# Patient Record
Sex: MALE | Race: WHITE | Employment: OTHER | URBAN - NONMETROPOLITAN AREA
[De-identification: names, ages, dates, MRNs, and addresses within clinical notes are randomized per-mention and may not be internally consistent; named-entity substitution may affect disease eponyms.]

---

## 2018-01-28 ENCOUNTER — HOSPITAL ENCOUNTER (INPATIENT)
Age: 68
LOS: 2 days | Discharge: HOME | DRG: 175 | End: 2018-01-30
Attending: HOSPITALIST | Admitting: HOSPITALIST
Payer: MEDICARE

## 2018-01-28 ENCOUNTER — APPOINTMENT (OUTPATIENT)
Dept: INTERVENTIONAL RADIOLOGY/VASCULAR | Age: 68
DRG: 175 | End: 2018-01-28
Attending: HOSPITALIST
Payer: MEDICARE

## 2018-01-28 PROBLEM — R77.8 ELEVATED TROPONIN: Status: ACTIVE | Noted: 2018-01-28

## 2018-01-28 PROBLEM — I10 ESSENTIAL HYPERTENSION: Status: ACTIVE | Noted: 2018-01-28

## 2018-01-28 PROBLEM — I26.92 ACUTE SADDLE PULMONARY EMBOLISM WITHOUT ACUTE COR PULMONALE (HCC): Status: ACTIVE | Noted: 2018-01-28

## 2018-01-28 PROBLEM — E11.69 TYPE 2 DIABETES MELLITUS WITH OTHER SPECIFIED COMPLICATION (HCC): Status: ACTIVE | Noted: 2018-01-28

## 2018-01-28 PROBLEM — E66.9 OBESITY (BMI 30.0-34.9): Status: ACTIVE | Noted: 2018-01-28

## 2018-01-28 PROBLEM — R91.1 PULMONARY NODULE: Status: ACTIVE | Noted: 2018-01-28

## 2018-01-28 LAB
ABO/RH: NORMAL
ANION GAP SERPL CALCULATED.3IONS-SCNC: 16 MMOL/L (ref 7–19)
ANTIBODY SCREEN: NORMAL
APTT: 32 SEC (ref 26–36.2)
APTT: 32 SEC (ref 26–36.2)
APTT: 33.6 SEC (ref 26–36.2)
BUN BLDV-MCNC: 34 MG/DL (ref 8–23)
CALCIUM SERPL-MCNC: 8.3 MG/DL (ref 8.8–10.2)
CHLORIDE BLD-SCNC: 94 MMOL/L (ref 98–111)
CHOLESTEROL, TOTAL: 115 MG/DL (ref 160–199)
CO2: 25 MMOL/L (ref 22–29)
CREAT SERPL-MCNC: 1.4 MG/DL (ref 0.5–1.2)
FIBRINOGEN: 319 MG/DL (ref 238–505)
FIBRINOGEN: 341 MG/DL (ref 238–505)
FIBRINOGEN: 345 MG/DL (ref 238–505)
GFR NON-AFRICAN AMERICAN: 51
GLUCOSE BLD-MCNC: 328 MG/DL (ref 70–99)
GLUCOSE BLD-MCNC: 336 MG/DL (ref 70–99)
GLUCOSE BLD-MCNC: 353 MG/DL (ref 70–99)
GLUCOSE BLD-MCNC: 429 MG/DL (ref 70–99)
GLUCOSE BLD-MCNC: 457 MG/DL (ref 74–109)
GLUCOSE BLD-MCNC: 463 MG/DL (ref 70–99)
HBA1C MFR BLD: 11.3 %
HDLC SERPL-MCNC: 28 MG/DL (ref 55–121)
INR BLD: 1.22 (ref 0.88–1.18)
LDL CHOLESTEROL CALCULATED: 42 MG/DL
LV EF: 60 %
LVEF MODALITY: NORMAL
PERFORMED ON: ABNORMAL
POTASSIUM SERPL-SCNC: 3.6 MMOL/L (ref 3.5–5)
PROTHROMBIN TIME: 15.4 SEC (ref 12–14.6)
SODIUM BLD-SCNC: 135 MMOL/L (ref 136–145)
TRIGL SERPL-MCNC: 226 MG/DL (ref 0–149)
TROPONIN: 0.07 NG/ML (ref 0–0.03)
TROPONIN: 0.15 NG/ML (ref 0–0.03)

## 2018-01-28 PROCEDURE — 6360000002 HC RX W HCPCS: Performed by: SURGERY

## 2018-01-28 PROCEDURE — 2580000003 HC RX 258: Performed by: SURGERY

## 2018-01-28 PROCEDURE — 85610 PROTHROMBIN TIME: CPT

## 2018-01-28 PROCEDURE — 93005 ELECTROCARDIOGRAM TRACING: CPT

## 2018-01-28 PROCEDURE — 75741 ARTERY X-RAYS LUNG: CPT | Performed by: SURGERY

## 2018-01-28 PROCEDURE — 37211 THROMBOLYTIC ART THERAPY: CPT | Performed by: SURGERY

## 2018-01-28 PROCEDURE — C1769 GUIDE WIRE: HCPCS

## 2018-01-28 PROCEDURE — C9113 INJ PANTOPRAZOLE SODIUM, VIA: HCPCS | Performed by: HOSPITALIST

## 2018-01-28 PROCEDURE — 2500000003 HC RX 250 WO HCPCS: Performed by: SURGERY

## 2018-01-28 PROCEDURE — 6370000000 HC RX 637 (ALT 250 FOR IP): Performed by: HOSPITALIST

## 2018-01-28 PROCEDURE — 36015 PLACE CATHETER IN ARTERY: CPT | Performed by: SURGERY

## 2018-01-28 PROCEDURE — 36415 COLL VENOUS BLD VENIPUNCTURE: CPT

## 2018-01-28 PROCEDURE — 2580000003 HC RX 258: Performed by: INTERNAL MEDICINE

## 2018-01-28 PROCEDURE — 85730 THROMBOPLASTIN TIME PARTIAL: CPT

## 2018-01-28 PROCEDURE — 80061 LIPID PANEL: CPT

## 2018-01-28 PROCEDURE — 99291 CRITICAL CARE FIRST HOUR: CPT | Performed by: INTERNAL MEDICINE

## 2018-01-28 PROCEDURE — 84484 ASSAY OF TROPONIN QUANT: CPT

## 2018-01-28 PROCEDURE — 75774 ARTERY X-RAY EACH VESSEL: CPT | Performed by: SURGERY

## 2018-01-28 PROCEDURE — 85220 BLOOC CLOT FACTOR V TEST: CPT

## 2018-01-28 PROCEDURE — 86850 RBC ANTIBODY SCREEN: CPT

## 2018-01-28 PROCEDURE — 93306 TTE W/DOPPLER COMPLETE: CPT

## 2018-01-28 PROCEDURE — 83036 HEMOGLOBIN GLYCOSYLATED A1C: CPT

## 2018-01-28 PROCEDURE — 82948 REAGENT STRIP/BLOOD GLUCOSE: CPT

## 2018-01-28 PROCEDURE — 6360000002 HC RX W HCPCS: Performed by: HOSPITALIST

## 2018-01-28 PROCEDURE — 94660 CPAP INITIATION&MGMT: CPT

## 2018-01-28 PROCEDURE — 6370000000 HC RX 637 (ALT 250 FOR IP): Performed by: SURGERY

## 2018-01-28 PROCEDURE — 93970 EXTREMITY STUDY: CPT

## 2018-01-28 PROCEDURE — 85384 FIBRINOGEN ACTIVITY: CPT

## 2018-01-28 PROCEDURE — 6360000004 HC RX CONTRAST MEDICATION: Performed by: SURGERY

## 2018-01-28 PROCEDURE — 2700000000 HC OXYGEN THERAPY PER DAY

## 2018-01-28 PROCEDURE — 76937 US GUIDE VASCULAR ACCESS: CPT | Performed by: SURGERY

## 2018-01-28 PROCEDURE — 80048 BASIC METABOLIC PNL TOTAL CA: CPT

## 2018-01-28 PROCEDURE — 99222 1ST HOSP IP/OBS MODERATE 55: CPT | Performed by: SURGERY

## 2018-01-28 PROCEDURE — 2000000000 HC ICU R&B

## 2018-01-28 PROCEDURE — 6370000000 HC RX 637 (ALT 250 FOR IP): Performed by: INTERNAL MEDICINE

## 2018-01-28 PROCEDURE — 86901 BLOOD TYPING SEROLOGIC RH(D): CPT

## 2018-01-28 PROCEDURE — 86900 BLOOD TYPING SEROLOGIC ABO: CPT

## 2018-01-28 PROCEDURE — 2709999900 IR ANGIOGRAM PULMONARY BILATERAL

## 2018-01-28 RX ORDER — HEPARIN SODIUM AND DEXTROSE 10000; 5 [USP'U]/100ML; G/100ML
400 INJECTION INTRAVENOUS CONTINUOUS
Status: DISCONTINUED | OUTPATIENT
Start: 2018-01-28 | End: 2018-01-29

## 2018-01-28 RX ORDER — MIDAZOLAM HYDROCHLORIDE 1 MG/ML
INJECTION INTRAMUSCULAR; INTRAVENOUS
Status: COMPLETED | OUTPATIENT
Start: 2018-01-28 | End: 2018-01-28

## 2018-01-28 RX ORDER — SODIUM CHLORIDE 9 MG/ML
INJECTION, SOLUTION INTRAVENOUS CONTINUOUS
Status: DISCONTINUED | OUTPATIENT
Start: 2018-01-28 | End: 2018-01-30 | Stop reason: HOSPADM

## 2018-01-28 RX ORDER — FENTANYL CITRATE 50 UG/ML
INJECTION, SOLUTION INTRAMUSCULAR; INTRAVENOUS
Status: COMPLETED | OUTPATIENT
Start: 2018-01-28 | End: 2018-01-28

## 2018-01-28 RX ORDER — PANTOPRAZOLE SODIUM 40 MG/10ML
40 INJECTION, POWDER, LYOPHILIZED, FOR SOLUTION INTRAVENOUS 2 TIMES DAILY
Status: DISCONTINUED | OUTPATIENT
Start: 2018-01-28 | End: 2018-01-30 | Stop reason: HOSPADM

## 2018-01-28 RX ORDER — SODIUM CHLORIDE 0.9 % (FLUSH) 0.9 %
10 SYRINGE (ML) INJECTION PRN
Status: DISCONTINUED | OUTPATIENT
Start: 2018-01-28 | End: 2018-01-30 | Stop reason: HOSPADM

## 2018-01-28 RX ORDER — SODIUM CHLORIDE 0.9 % (FLUSH) 0.9 %
10 SYRINGE (ML) INJECTION EVERY 12 HOURS SCHEDULED
Status: DISCONTINUED | OUTPATIENT
Start: 2018-01-28 | End: 2018-01-30 | Stop reason: HOSPADM

## 2018-01-28 RX ORDER — LISINOPRIL 5 MG/1
5 TABLET ORAL DAILY
COMMUNITY

## 2018-01-28 RX ORDER — LISINOPRIL 5 MG/1
5 TABLET ORAL DAILY
Status: DISCONTINUED | OUTPATIENT
Start: 2018-01-28 | End: 2018-01-30 | Stop reason: HOSPADM

## 2018-01-28 RX ORDER — ACETAMINOPHEN 325 MG/1
650 TABLET ORAL EVERY 4 HOURS PRN
Status: DISCONTINUED | OUTPATIENT
Start: 2018-01-28 | End: 2018-01-30 | Stop reason: HOSPADM

## 2018-01-28 RX ORDER — LIDOCAINE HYDROCHLORIDE 20 MG/ML
INJECTION, SOLUTION INFILTRATION; PERINEURAL
Status: COMPLETED | OUTPATIENT
Start: 2018-01-28 | End: 2018-01-28

## 2018-01-28 RX ORDER — NICOTINE POLACRILEX 4 MG
15 LOZENGE BUCCAL PRN
Status: DISCONTINUED | OUTPATIENT
Start: 2018-01-28 | End: 2018-01-30 | Stop reason: HOSPADM

## 2018-01-28 RX ORDER — IODIXANOL 320 MG/ML
INJECTION, SOLUTION INTRAVASCULAR
Status: COMPLETED | OUTPATIENT
Start: 2018-01-28 | End: 2018-01-28

## 2018-01-28 RX ORDER — ASPIRIN 81 MG/1
81 TABLET, CHEWABLE ORAL DAILY
Status: DISCONTINUED | OUTPATIENT
Start: 2018-01-29 | End: 2018-01-30 | Stop reason: HOSPADM

## 2018-01-28 RX ORDER — DEXTROSE MONOHYDRATE 25 G/50ML
12.5 INJECTION, SOLUTION INTRAVENOUS PRN
Status: DISCONTINUED | OUTPATIENT
Start: 2018-01-28 | End: 2018-01-30 | Stop reason: HOSPADM

## 2018-01-28 RX ORDER — DEXTROSE MONOHYDRATE 50 MG/ML
100 INJECTION, SOLUTION INTRAVENOUS PRN
Status: DISCONTINUED | OUTPATIENT
Start: 2018-01-28 | End: 2018-01-30 | Stop reason: HOSPADM

## 2018-01-28 RX ORDER — ONDANSETRON 2 MG/ML
4 INJECTION INTRAMUSCULAR; INTRAVENOUS EVERY 6 HOURS PRN
Status: DISCONTINUED | OUTPATIENT
Start: 2018-01-28 | End: 2018-01-30 | Stop reason: HOSPADM

## 2018-01-28 RX ORDER — HYDROCODONE BITARTRATE AND ACETAMINOPHEN 7.5; 325 MG/1; MG/1
1 TABLET ORAL ONCE
Status: COMPLETED | OUTPATIENT
Start: 2018-01-28 | End: 2018-01-28

## 2018-01-28 RX ORDER — HEPARIN SODIUM 5000 [USP'U]/ML
INJECTION, SOLUTION INTRAVENOUS; SUBCUTANEOUS
Status: COMPLETED | OUTPATIENT
Start: 2018-01-28 | End: 2018-01-28

## 2018-01-28 RX ADMIN — HEPARIN SODIUM 5000 UNITS: 5000 INJECTION, SOLUTION INTRAVENOUS; SUBCUTANEOUS at 11:54

## 2018-01-28 RX ADMIN — HYDROCODONE BITARTRATE AND ACETAMINOPHEN 1 TABLET: 7.5; 325 TABLET ORAL at 22:03

## 2018-01-28 RX ADMIN — FENTANYL CITRATE 25 MCG: 50 INJECTION, SOLUTION INTRAMUSCULAR; INTRAVENOUS at 11:44

## 2018-01-28 RX ADMIN — CEFAZOLIN SODIUM 1 G: 1 INJECTION, SOLUTION INTRAVENOUS at 11:41

## 2018-01-28 RX ADMIN — LISINOPRIL 5 MG: 5 TABLET ORAL at 15:42

## 2018-01-28 RX ADMIN — LIDOCAINE HYDROCHLORIDE 10 ML: 20 INJECTION, SOLUTION INFILTRATION; PERINEURAL at 11:54

## 2018-01-28 RX ADMIN — PANTOPRAZOLE SODIUM 40 MG: 40 INJECTION, POWDER, FOR SOLUTION INTRAVENOUS at 10:59

## 2018-01-28 RX ADMIN — CEFAZOLIN SODIUM 1 G: 1 INJECTION, SOLUTION INTRAVENOUS at 20:28

## 2018-01-28 RX ADMIN — FENTANYL CITRATE 25 MCG: 50 INJECTION, SOLUTION INTRAMUSCULAR; INTRAVENOUS at 12:03

## 2018-01-28 RX ADMIN — IODIXANOL 30 ML: 320 INJECTION, SOLUTION INTRAVASCULAR at 12:25

## 2018-01-28 RX ADMIN — INSULIN LISPRO 11 UNITS: 100 INJECTION, SOLUTION INTRAVENOUS; SUBCUTANEOUS at 20:34

## 2018-01-28 RX ADMIN — INSULIN LISPRO 12 UNITS: 100 INJECTION, SOLUTION INTRAVENOUS; SUBCUTANEOUS at 17:36

## 2018-01-28 RX ADMIN — SODIUM CHLORIDE: 9 INJECTION, SOLUTION INTRAVENOUS at 09:16

## 2018-01-28 RX ADMIN — Medication 10 ML: at 10:21

## 2018-01-28 RX ADMIN — INSULIN LISPRO 8 UNITS: 100 INJECTION, SOLUTION INTRAVENOUS; SUBCUTANEOUS at 10:18

## 2018-01-28 RX ADMIN — MIDAZOLAM HYDROCHLORIDE 1 MG: 1 INJECTION, SOLUTION INTRAMUSCULAR; INTRAVENOUS at 12:03

## 2018-01-28 RX ADMIN — Medication 10 ML: at 20:28

## 2018-01-28 RX ADMIN — ALTEPLASE 1 MG/HR: 2.2 INJECTION, POWDER, LYOPHILIZED, FOR SOLUTION INTRAVENOUS at 12:20

## 2018-01-28 RX ADMIN — FENTANYL CITRATE 50 MCG: 50 INJECTION, SOLUTION INTRAMUSCULAR; INTRAVENOUS at 12:12

## 2018-01-28 RX ADMIN — MIDAZOLAM HYDROCHLORIDE 1 MG: 1 INJECTION, SOLUTION INTRAMUSCULAR; INTRAVENOUS at 11:44

## 2018-01-28 RX ADMIN — HEPARIN SODIUM AND DEXTROSE 400 UNITS/HR: 10000; 5 INJECTION INTRAVENOUS at 12:19

## 2018-01-28 RX ADMIN — ALTEPLASE 1 MG/HR: 2.2 INJECTION, POWDER, LYOPHILIZED, FOR SOLUTION INTRAVENOUS at 21:59

## 2018-01-28 RX ADMIN — PANTOPRAZOLE SODIUM 40 MG: 40 INJECTION, POWDER, FOR SOLUTION INTRAVENOUS at 20:28

## 2018-01-28 RX ADMIN — SODIUM CHLORIDE: 9 INJECTION, SOLUTION INTRAVENOUS at 12:20

## 2018-01-28 RX ADMIN — ALTEPLASE 6 MG: 2.2 INJECTION, POWDER, LYOPHILIZED, FOR SOLUTION INTRAVENOUS at 12:09

## 2018-01-28 ASSESSMENT — PAIN SCALES - GENERAL
PAINLEVEL_OUTOF10: 0
PAINLEVEL_OUTOF10: 0
PAINLEVEL_OUTOF10: 6
PAINLEVEL_OUTOF10: 0
PAINLEVEL_OUTOF10: 0

## 2018-01-28 ASSESSMENT — ENCOUNTER SYMPTOMS
DOUBLE VISION: 0
SHORTNESS OF BREATH: 0
COUGH: 1
ORTHOPNEA: 0
PHOTOPHOBIA: 0
NAUSEA: 0
VOMITING: 0
HEARTBURN: 0
BLURRED VISION: 0
SPUTUM PRODUCTION: 0
HEMOPTYSIS: 0
WHEEZING: 0

## 2018-01-28 NOTE — H&P
Hospital Medicine    History & Physical      CC: Syncopal episode transferred from another hospital with saddle pulmonary embolism    History Obtained From:  patient, electronic medical record    HISTORY OF PRESENT ILLNESS:    The patient is a 74 y.o. male who presents to an outside hospital after syncopal episode he have any premonitory symptoms however he did have some difficulty breathing while he was on the floor, after he was in the hospital he was not short of breath. The case was discussed with vascular surgeon and instructed to be transferred to this facility. He denies any chest pain, he does have a strong family history of deep venous thrombosis with factor V deficiency, however he was tested in [place] and he was told he didn't have any problems. He does recall he had a few weeks' history of coughing, denies any lower extremity swelling no recent travel,  He did receive Lovenox full dose and another hospital and we will consult with vascular to see if they prefer Lovenox versus heparin drip. Past Medical History:    No past medical history on file. Hypertension  Past Surgical History:    No past surgical history on file. Medications Prior to Admission:    No prescriptions prior to admission. Allergies:  Review of patient's allergies indicates not on file. Social History:   TOBACCO:   has no tobacco history on file. ETOH:   has no alcohol history on file. Patient currently lives with family wife, denies alcohol use he quit smoking many years ago     Family History:   No family history on file. I have personally reviewed above histories  REVIEW OF SYSTEMS:  Review of Systems   Constitutional: Negative for chills, fever, malaise/fatigue and weight loss. HENT: Negative. Eyes: Negative for blurred vision, double vision and photophobia. Respiratory: Positive for cough. Negative for hemoptysis, sputum production, shortness of breath and wheezing.     Cardiovascular: Negative for chest pain, palpitations, orthopnea, claudication and leg swelling. Gastrointestinal: Negative for heartburn, nausea and vomiting. Genitourinary: Negative. Musculoskeletal: Negative. Skin: Negative. Neurological: Negative. Endo/Heme/Allergies: Negative. Psychiatric/Behavioral: Negative. PHYSICAL EXAM:  /71   Pulse 91   Resp 17   SpO2 95%   Physical Exam   Constitutional: He is oriented to person, place, and time. Vital signs are normal. He appears well-developed and well-nourished. Non-toxic appearance. He does not have a sickly appearance. He does not appear ill. No distress. HENT:   Head: Normocephalic and atraumatic. Eyes: Lids are normal. Pupils are equal, round, and reactive to light. Neck: Neck supple. No JVD present. Carotid bruit is not present. Cardiovascular: Normal rate and regular rhythm. Exam reveals no S3. No murmur heard. Pulses:       Carotid pulses are 2+ on the right side, and 2+ on the left side. Radial pulses are 2+ on the right side, and 2+ on the left side. Pulmonary/Chest: Effort normal and breath sounds normal. He has no decreased breath sounds. He has no wheezes. He has no rhonchi. He has no rales. Abdominal: Soft. Normal appearance and bowel sounds are normal. There is no hepatosplenomegaly. There is no tenderness. Musculoskeletal:        Right lower leg: He exhibits no swelling and no edema. Left lower leg: He exhibits no swelling and no edema. Neurological: He is alert and oriented to person, place, and time. No sensory deficit. Skin: Skin is warm and dry. Psychiatric: His speech is normal and behavior is normal.     DATA:  EKG:  I have reviewed EKG with the following interpretation:  Imaging:    No orders to display            Labs Reviewed - No data to display       IMPRESSION:    1. Saddle pulmonary embolism  2. Syncope secondary to above  3. History of hypertension      PLAN:   1. Admit the patient to CCU  2.  Oxygen per nasal cannula  3. Repeat EKG  4. Echocardiogram  5. Repeat labs  6. Vascular surgery consult  7. This color now with Lovenox will as vascular what their preference is if they decide to do an intervention  8. Review medication  9. I did explain to the patient and his wife his condition and plan of care. Critical care time 35 minutes the patient has a potential for deterioration in view of his pulmonary embolism.       Sugar Maynard MD    Internal Medicine Hospitalist

## 2018-01-28 NOTE — CONSULTS
Physiologic. Skin  warm, dry, and intact. No rash, erythema, or pallor. Psychiatric  mood, affect, and behavior appear normal.  Judgment and thought processes appear normal.    I went over his Echo with Dr. Elsi Rodriguez and also reviewed the images and report of his CT from [address] [place] [address] have been discussed with the patient including continued medical management and proceeding with lysis,  By echo he has right heart strain (as well as by CT)    I went over with him the regardless of acute treatment he will need to be on anticoagulation. He asked appropriate questions and wishes to proceed with lysis. Risks of angiography and lysis and potential intervention have been reviewed with the patient including but not limited to MI, death, CVA, bleeding, nerve injury, infection, atheroembolic event, contrast nephropathy, possible dialysis, and need for possible surgery. Assessment  1. PE with right heart stain      Plan    1. Lysis, via EKOS catheter.

## 2018-01-29 LAB
ALBUMIN SERPL-MCNC: 3.2 G/DL (ref 3.5–5.2)
ALP BLD-CCNC: 53 U/L (ref 40–130)
ALT SERPL-CCNC: 19 U/L (ref 5–41)
ANION GAP SERPL CALCULATED.3IONS-SCNC: 11 MMOL/L (ref 7–19)
APTT: 33.2 SEC (ref 26–36.2)
AST SERPL-CCNC: 15 U/L (ref 5–40)
BASOPHILS ABSOLUTE: 0 K/UL (ref 0–0.2)
BASOPHILS RELATIVE PERCENT: 0.2 % (ref 0–1)
BILIRUB SERPL-MCNC: 0.6 MG/DL (ref 0.2–1.2)
BUN BLDV-MCNC: 23 MG/DL (ref 8–23)
CALCIUM SERPL-MCNC: 8 MG/DL (ref 8.8–10.2)
CHLORIDE BLD-SCNC: 99 MMOL/L (ref 98–111)
CO2: 27 MMOL/L (ref 22–29)
CREAT SERPL-MCNC: 1.2 MG/DL (ref 0.5–1.2)
EKG P AXIS: 46 DEGREES
EKG P-R INTERVAL: 148 MS
EKG Q-T INTERVAL: 358 MS
EKG QRS DURATION: 96 MS
EKG QTC CALCULATION (BAZETT): 408 MS
EKG T AXIS: 36 DEGREES
EOSINOPHILS ABSOLUTE: 0.1 K/UL (ref 0–0.6)
EOSINOPHILS RELATIVE PERCENT: 0.7 % (ref 0–5)
GFR NON-AFRICAN AMERICAN: >60
GLUCOSE BLD-MCNC: 250 MG/DL (ref 70–99)
GLUCOSE BLD-MCNC: 267 MG/DL (ref 70–99)
GLUCOSE BLD-MCNC: 283 MG/DL (ref 74–109)
GLUCOSE BLD-MCNC: 308 MG/DL (ref 70–99)
GLUCOSE BLD-MCNC: 336 MG/DL (ref 70–99)
HCT VFR BLD CALC: 37 % (ref 42–52)
HEMOGLOBIN: 12.5 G/DL (ref 14–18)
INR BLD: 1.14 (ref 0.88–1.18)
LYMPHOCYTES ABSOLUTE: 1.5 K/UL (ref 1.1–4.5)
LYMPHOCYTES RELATIVE PERCENT: 10.2 % (ref 20–40)
MCH RBC QN AUTO: 31.5 PG (ref 27–31)
MCHC RBC AUTO-ENTMCNC: 33.8 G/DL (ref 33–37)
MCV RBC AUTO: 93.2 FL (ref 80–94)
MONOCYTES ABSOLUTE: 1 K/UL (ref 0–0.9)
MONOCYTES RELATIVE PERCENT: 6.9 % (ref 0–10)
NEUTROPHILS ABSOLUTE: 11.7 K/UL (ref 1.5–7.5)
NEUTROPHILS RELATIVE PERCENT: 81.5 % (ref 50–65)
PDW BLD-RTO: 12.3 % (ref 11.5–14.5)
PERFORMED ON: ABNORMAL
PLATELET # BLD: 131 K/UL (ref 130–400)
PMV BLD AUTO: 10.6 FL (ref 9.4–12.4)
POTASSIUM SERPL-SCNC: 3.5 MMOL/L (ref 3.5–5)
PROTHROMBIN TIME: 14.5 SEC (ref 12–14.6)
RBC # BLD: 3.97 M/UL (ref 4.7–6.1)
SODIUM BLD-SCNC: 137 MMOL/L (ref 136–145)
TOTAL PROTEIN: 6.5 G/DL (ref 6.6–8.7)
WBC # BLD: 14.3 K/UL (ref 4.8–10.8)

## 2018-01-29 PROCEDURE — 36415 COLL VENOUS BLD VENIPUNCTURE: CPT

## 2018-01-29 PROCEDURE — 6370000000 HC RX 637 (ALT 250 FOR IP): Performed by: SURGERY

## 2018-01-29 PROCEDURE — 2700000000 HC OXYGEN THERAPY PER DAY

## 2018-01-29 PROCEDURE — 6360000002 HC RX W HCPCS: Performed by: HOSPITALIST

## 2018-01-29 PROCEDURE — 99291 CRITICAL CARE FIRST HOUR: CPT | Performed by: FAMILY MEDICINE

## 2018-01-29 PROCEDURE — B31TYZZ FLUOROSCOPY OF LEFT PULMONARY ARTERY USING OTHER CONTRAST: ICD-10-PCS | Performed by: SURGERY

## 2018-01-29 PROCEDURE — 6370000000 HC RX 637 (ALT 250 FOR IP): Performed by: HOSPITALIST

## 2018-01-29 PROCEDURE — 80053 COMPREHEN METABOLIC PANEL: CPT

## 2018-01-29 PROCEDURE — 2580000003 HC RX 258: Performed by: INTERNAL MEDICINE

## 2018-01-29 PROCEDURE — C9113 INJ PANTOPRAZOLE SODIUM, VIA: HCPCS | Performed by: HOSPITALIST

## 2018-01-29 PROCEDURE — 85730 THROMBOPLASTIN TIME PARTIAL: CPT

## 2018-01-29 PROCEDURE — 37214 CESSJ THERAPY CATH REMOVAL: CPT | Performed by: SURGERY

## 2018-01-29 PROCEDURE — 1210000000 HC MED SURG R&B

## 2018-01-29 PROCEDURE — 85610 PROTHROMBIN TIME: CPT

## 2018-01-29 PROCEDURE — 02HR33Z INSERTION OF INFUSION DEVICE INTO LEFT PULMONARY ARTERY, PERCUTANEOUS APPROACH: ICD-10-PCS | Performed by: SURGERY

## 2018-01-29 PROCEDURE — 3E06317 INTRODUCTION OF OTHER THROMBOLYTIC INTO CENTRAL ARTERY, PERCUTANEOUS APPROACH: ICD-10-PCS | Performed by: SURGERY

## 2018-01-29 PROCEDURE — 85025 COMPLETE CBC W/AUTO DIFF WBC: CPT

## 2018-01-29 PROCEDURE — 6370000000 HC RX 637 (ALT 250 FOR IP): Performed by: FAMILY MEDICINE

## 2018-01-29 PROCEDURE — 93970 EXTREMITY STUDY: CPT

## 2018-01-29 PROCEDURE — 51700 IRRIGATION OF BLADDER: CPT

## 2018-01-29 PROCEDURE — 2580000003 HC RX 258: Performed by: SURGERY

## 2018-01-29 PROCEDURE — 82948 REAGENT STRIP/BLOOD GLUCOSE: CPT

## 2018-01-29 PROCEDURE — 6360000002 HC RX W HCPCS: Performed by: SURGERY

## 2018-01-29 RX ORDER — WARFARIN SODIUM 5 MG/1
5 TABLET ORAL DAILY
Status: DISCONTINUED | OUTPATIENT
Start: 2018-01-29 | End: 2018-01-30 | Stop reason: HOSPADM

## 2018-01-29 RX ORDER — INSULIN GLARGINE 100 [IU]/ML
5 INJECTION, SOLUTION SUBCUTANEOUS 2 TIMES DAILY
Status: DISCONTINUED | OUTPATIENT
Start: 2018-01-29 | End: 2018-01-30 | Stop reason: HOSPADM

## 2018-01-29 RX ADMIN — ALTEPLASE 1 MG/HR: 2.2 INJECTION, POWDER, LYOPHILIZED, FOR SOLUTION INTRAVENOUS at 03:54

## 2018-01-29 RX ADMIN — INSULIN LISPRO 12 UNITS: 100 INJECTION, SOLUTION INTRAVENOUS; SUBCUTANEOUS at 18:04

## 2018-01-29 RX ADMIN — ASPIRIN 81 MG CHEWABLE TABLET 81 MG: 81 TABLET CHEWABLE at 09:34

## 2018-01-29 RX ADMIN — CEFAZOLIN SODIUM 1 G: 1 INJECTION, SOLUTION INTRAVENOUS at 11:00

## 2018-01-29 RX ADMIN — PANTOPRAZOLE SODIUM 40 MG: 40 INJECTION, POWDER, FOR SOLUTION INTRAVENOUS at 09:34

## 2018-01-29 RX ADMIN — CEFAZOLIN SODIUM 1 G: 1 INJECTION, SOLUTION INTRAVENOUS at 19:00

## 2018-01-29 RX ADMIN — Medication 10 ML: at 09:25

## 2018-01-29 RX ADMIN — LISINOPRIL 5 MG: 5 TABLET ORAL at 09:34

## 2018-01-29 RX ADMIN — PANTOPRAZOLE SODIUM 40 MG: 40 INJECTION, POWDER, FOR SOLUTION INTRAVENOUS at 21:58

## 2018-01-29 RX ADMIN — Medication 10 ML: at 21:58

## 2018-01-29 RX ADMIN — WARFARIN SODIUM 5 MG: 5 TABLET ORAL at 17:46

## 2018-01-29 RX ADMIN — ENOXAPARIN SODIUM 120 MG: 120 INJECTION SUBCUTANEOUS at 18:39

## 2018-01-29 RX ADMIN — INSULIN LISPRO 7 UNITS: 100 INJECTION, SOLUTION INTRAVENOUS; SUBCUTANEOUS at 21:56

## 2018-01-29 RX ADMIN — CEFAZOLIN SODIUM 1 G: 1 INJECTION, SOLUTION INTRAVENOUS at 03:18

## 2018-01-29 RX ADMIN — INSULIN LISPRO 9 UNITS: 100 INJECTION, SOLUTION INTRAVENOUS; SUBCUTANEOUS at 09:21

## 2018-01-29 RX ADMIN — INSULIN LISPRO 11 UNITS: 100 INJECTION, SOLUTION INTRAVENOUS; SUBCUTANEOUS at 13:40

## 2018-01-29 RX ADMIN — INSULIN GLARGINE 5 UNITS: 100 INJECTION, SOLUTION SUBCUTANEOUS at 23:03

## 2018-01-29 RX ADMIN — INSULIN GLARGINE 5 UNITS: 100 INJECTION, SOLUTION SUBCUTANEOUS at 13:44

## 2018-01-29 RX ADMIN — SODIUM CHLORIDE: 9 INJECTION, SOLUTION INTRAVENOUS at 17:50

## 2018-01-29 ASSESSMENT — PAIN SCALES - GENERAL
PAINLEVEL_OUTOF10: 0

## 2018-01-30 VITALS
SYSTOLIC BLOOD PRESSURE: 115 MMHG | OXYGEN SATURATION: 95 % | WEIGHT: 255.4 LBS | TEMPERATURE: 99.7 F | BODY MASS INDEX: 34.59 KG/M2 | HEIGHT: 72 IN | HEART RATE: 88 BPM | RESPIRATION RATE: 20 BRPM | DIASTOLIC BLOOD PRESSURE: 69 MMHG

## 2018-01-30 DIAGNOSIS — I26.02 ACUTE SADDLE PULMONARY EMBOLISM WITH ACUTE COR PULMONALE (HCC): Primary | ICD-10-CM

## 2018-01-30 PROBLEM — N40.0 ENLARGED PROSTATE: Status: ACTIVE | Noted: 2018-01-30

## 2018-01-30 LAB
ALBUMIN SERPL-MCNC: 2.8 G/DL (ref 3.5–5.2)
ALP BLD-CCNC: 47 U/L (ref 40–130)
ALT SERPL-CCNC: 13 U/L (ref 5–41)
ANION GAP SERPL CALCULATED.3IONS-SCNC: 12 MMOL/L (ref 7–19)
AST SERPL-CCNC: 15 U/L (ref 5–40)
BASOPHILS ABSOLUTE: 0 K/UL (ref 0–0.2)
BASOPHILS RELATIVE PERCENT: 0.2 % (ref 0–1)
BILIRUB SERPL-MCNC: 0.5 MG/DL (ref 0.2–1.2)
BUN BLDV-MCNC: 22 MG/DL (ref 8–23)
CALCIUM SERPL-MCNC: 7.7 MG/DL (ref 8.8–10.2)
CHLORIDE BLD-SCNC: 104 MMOL/L (ref 98–111)
CO2: 23 MMOL/L (ref 22–29)
CREAT SERPL-MCNC: 1.2 MG/DL (ref 0.5–1.2)
EOSINOPHILS ABSOLUTE: 0.2 K/UL (ref 0–0.6)
EOSINOPHILS RELATIVE PERCENT: 2.2 % (ref 0–5)
FACTOR V ACTIVITY: 61 % (ref 62–140)
GFR NON-AFRICAN AMERICAN: >60
GLUCOSE BLD-MCNC: 167 MG/DL (ref 74–109)
GLUCOSE BLD-MCNC: 195 MG/DL (ref 70–99)
GLUCOSE BLD-MCNC: 207 MG/DL (ref 70–99)
GLUCOSE BLD-MCNC: 263 MG/DL (ref 70–99)
HCT VFR BLD CALC: 36.2 % (ref 42–52)
HEMOGLOBIN: 11.9 G/DL (ref 14–18)
INR BLD: 1.14 (ref 0.88–1.18)
LYMPHOCYTES ABSOLUTE: 1.3 K/UL (ref 1.1–4.5)
LYMPHOCYTES RELATIVE PERCENT: 12.8 % (ref 20–40)
MAGNESIUM: 2.1 MG/DL (ref 1.6–2.4)
MCH RBC QN AUTO: 30.4 PG (ref 27–31)
MCHC RBC AUTO-ENTMCNC: 32.9 G/DL (ref 33–37)
MCV RBC AUTO: 92.3 FL (ref 80–94)
MONOCYTES ABSOLUTE: 0.8 K/UL (ref 0–0.9)
MONOCYTES RELATIVE PERCENT: 7.2 % (ref 0–10)
NEUTROPHILS ABSOLUTE: 8.1 K/UL (ref 1.5–7.5)
NEUTROPHILS RELATIVE PERCENT: 76.9 % (ref 50–65)
PDW BLD-RTO: 12.2 % (ref 11.5–14.5)
PERFORMED ON: ABNORMAL
PLATELET # BLD: 126 K/UL (ref 130–400)
PMV BLD AUTO: 10.8 FL (ref 9.4–12.4)
POTASSIUM SERPL-SCNC: 3.1 MMOL/L (ref 3.5–5)
PROTHROMBIN TIME: 14.5 SEC (ref 12–14.6)
RBC # BLD: 3.92 M/UL (ref 4.7–6.1)
SODIUM BLD-SCNC: 139 MMOL/L (ref 136–145)
TOTAL PROTEIN: 5.9 G/DL (ref 6.6–8.7)
WBC # BLD: 10.5 K/UL (ref 4.8–10.8)

## 2018-01-30 PROCEDURE — 85025 COMPLETE CBC W/AUTO DIFF WBC: CPT

## 2018-01-30 PROCEDURE — 6360000002 HC RX W HCPCS: Performed by: NURSE PRACTITIONER

## 2018-01-30 PROCEDURE — 80053 COMPREHEN METABOLIC PANEL: CPT

## 2018-01-30 PROCEDURE — 85610 PROTHROMBIN TIME: CPT

## 2018-01-30 PROCEDURE — 2580000003 HC RX 258: Performed by: INTERNAL MEDICINE

## 2018-01-30 PROCEDURE — 6360000002 HC RX W HCPCS: Performed by: SURGERY

## 2018-01-30 PROCEDURE — 2580000003 HC RX 258: Performed by: NURSE PRACTITIONER

## 2018-01-30 PROCEDURE — C9113 INJ PANTOPRAZOLE SODIUM, VIA: HCPCS | Performed by: HOSPITALIST

## 2018-01-30 PROCEDURE — 99239 HOSP IP/OBS DSCHRG MGMT >30: CPT | Performed by: HOSPITALIST

## 2018-01-30 PROCEDURE — 82948 REAGENT STRIP/BLOOD GLUCOSE: CPT

## 2018-01-30 PROCEDURE — 6370000000 HC RX 637 (ALT 250 FOR IP): Performed by: FAMILY MEDICINE

## 2018-01-30 PROCEDURE — 6370000000 HC RX 637 (ALT 250 FOR IP): Performed by: SURGERY

## 2018-01-30 PROCEDURE — 36415 COLL VENOUS BLD VENIPUNCTURE: CPT

## 2018-01-30 PROCEDURE — 83735 ASSAY OF MAGNESIUM: CPT

## 2018-01-30 PROCEDURE — 6370000000 HC RX 637 (ALT 250 FOR IP): Performed by: HOSPITALIST

## 2018-01-30 PROCEDURE — 6360000002 HC RX W HCPCS: Performed by: HOSPITALIST

## 2018-01-30 RX ORDER — WARFARIN SODIUM 5 MG/1
5 TABLET ORAL DAILY
Qty: 30 TABLET | Refills: 3 | Status: SHIPPED | OUTPATIENT
Start: 2018-01-30 | End: 2018-09-21 | Stop reason: ALTCHOICE

## 2018-01-30 RX ORDER — POTASSIUM CHLORIDE 7.45 MG/ML
10 INJECTION INTRAVENOUS
Status: DISCONTINUED | OUTPATIENT
Start: 2018-01-30 | End: 2018-01-30 | Stop reason: RX

## 2018-01-30 RX ADMIN — ASPIRIN 81 MG CHEWABLE TABLET 81 MG: 81 TABLET CHEWABLE at 08:22

## 2018-01-30 RX ADMIN — POTASSIUM CHLORIDE 40 MEQ: 2 INJECTION, SOLUTION, CONCENTRATE INTRAVENOUS at 08:22

## 2018-01-30 RX ADMIN — INSULIN LISPRO 5 UNITS: 100 INJECTION, SOLUTION INTRAVENOUS; SUBCUTANEOUS at 17:48

## 2018-01-30 RX ADMIN — PANTOPRAZOLE SODIUM 40 MG: 40 INJECTION, POWDER, FOR SOLUTION INTRAVENOUS at 08:22

## 2018-01-30 RX ADMIN — WARFARIN SODIUM 5 MG: 5 TABLET ORAL at 17:48

## 2018-01-30 RX ADMIN — INSULIN LISPRO 5 UNITS: 100 INJECTION, SOLUTION INTRAVENOUS; SUBCUTANEOUS at 08:24

## 2018-01-30 RX ADMIN — Medication 10 ML: at 08:25

## 2018-01-30 RX ADMIN — ENOXAPARIN SODIUM 120 MG: 120 INJECTION SUBCUTANEOUS at 08:22

## 2018-01-30 RX ADMIN — INSULIN LISPRO 8 UNITS: 100 INJECTION, SOLUTION INTRAVENOUS; SUBCUTANEOUS at 12:41

## 2018-01-30 RX ADMIN — INSULIN GLARGINE 5 UNITS: 100 INJECTION, SOLUTION SUBCUTANEOUS at 10:24

## 2018-01-30 RX ADMIN — LISINOPRIL 5 MG: 5 TABLET ORAL at 08:22

## 2018-01-30 NOTE — DISCHARGE INSTR - DIET

## 2018-01-30 NOTE — CARE COORDINATION
Spoke with [facility] pharmacy, lovenox script will be $12.50 for all. MMargieD. And pt aware.

## 2018-01-30 NOTE — DISCHARGE SUMMARY
(PRINIVIL;ZESTRIL) 5 MG tablet Take 5 mg by mouth daily               Time Spent on discharge is more than 45 minutes in the examination, evaluation, counseling and review of medications and discharge plan. Amy Gómez D.O. Internal Medicine Hospitalist    Thank you Da Falcon for the opportunity to be involved in this patient's care.  If you have any questions or concerns please feel free to contact me at [phone]

## 2018-01-30 NOTE — PROGRESS NOTES
PT wife came back to visit at [address] [facility] informed her that his urine output had been trending down. She stated that the pt has large amounts of mucous in in his urine due to his yamikla-bladder. She stated when the pt was hospitalized previously, he had problems with his catheter occluding and it had to be irrigated hourly to prevent this. His catheter was difficult to irrigate was found to be occluded. It was removed and replaced. He had 250mL output initially. The following hour his catheter was irrigatied again and he had good output. At the 0400 hour, it took several attempts of flushing his catheter and aspirating to get urine output. Catheter does not appear to be draining freely. Call placed to hospitalist for further evalutation. CBI ordered via telephone by Dr. Josue Robles.
Vascular Surgery Rounding Note    1/30/2018  8:42 AM      Post Hosp day - 3, post procedure day #2 EKOS sheath for TPA, pulmonary arteriogram    Subjective- breathing much easier, stating he still has a cough    Objective-   Invalid input(s): 24H    Intake/Output Summary (Last 24 hours) at 01/30/18 0842  Last data filed at 01/30/18 0825   Gross per 24 hour   Intake          1541.98 ml   Output             1400 ml   Net           141.98 ml     In: 10 [I.V.:10]  Out: -     CBC:   Recent Labs      01/29/18   0129 01/30/18   0438   WBC  14.3*  10.5   RBC  3.97*  3.92*   HGB  12.5*  11.9*   HCT  37.0*  36.2*   MCV  93.2  92.3   MCH  31.5*  30.4   MCHC  33.8  32.9*   RDW  12.3  12.2   PLT  131  126*   MPV  10.6  10.8      Last 3 CMP:   Recent Labs      01/28/18   0624  01/29/18 0129 01/30/18   0438   NA  135*  137  139   K  3.6  3.5  3.1*   CL  94*  99  104   CO2  25  27  23   BUN  34*  23  22   CREATININE  1.4*  1.2  1.2   GLUCOSE  457*  283*  167*   CALCIUM  8.3*  8.0*  7.7*   PROT   --   6.5*  5.9*   LABALBU   --   3.2*  2.8*   BILITOT   --   0.6  0.5   ALKPHOS   --   53  47   AST   --   15  15   ALT   --   19  13      Troponin:   Recent Labs      01/28/18   0624  01/28/18   1337   TROPONINI  0.15*  0.07*     Calcium:   Lab Results   Component Value Date    CALCIUM 7.7 01/30/2018    CALCIUM 8.0 01/29/2018    CALCIUM 8.3 01/28/2018      Ionized Calcium: No results found for: IONCA   Lipids:   Recent Labs      01/28/18   0807   CHOL  115*   HDL  28*     INR:   Recent Labs      01/28/18   0807  01/29/18   0129 01/30/18   0438   INR  1.22*  1.14  1.14     Lactic Acid: No results found for: LACTA     Physical Exam:  Awake, alert, appropriate Yes  /76   Pulse 87   Temp 100.7 °F (38.2 °C) (Temporal)   Resp 20   Ht 6' (1.829 m)   Wt 255 lb 6.4 oz (115.8 kg)   SpO2 94%   BMI 34.64 kg/m²   Heart-Normal S1 and S2.  Regular rhythm. No murmurs, gallops, or rubs.    Lung-positive findings: clear bilaterally
72 hours. Recent Labs      01/29/18   0129   AST  15   ALT  19   BILITOT  0.6   ALKPHOS  53        ABGs:No results for input(s): PHART, NZC3KER, PO2ART, JEE7KSD, BEART, HGBAE, B4XKBUIM, CARBOXHGBART, 02THERAPY in the last 72 hours. HgBA1c: 11.3    FLP:    Lab Results   Component Value Date    TRIG 226 01/28/2018    HDL 28 01/28/2018    LDLCALC 42 01/28/2018     TSH:  pending    Troponin T:   Recent Labs      01/28/18   0624  01/28/18   1337   TROPONINI  0.15*  0.07*     INR:   Recent Labs      01/28/18   0807  01/29/18   0129   INR  1.22*  1.14     EKG 1/28/18 NSR 89, NSSTW changes, no acute changes    Echocardiogram 1/28/18    Summary   Normal LV chamber size and overall systolic function.   No hemodynamically significant flow abnormality.   Diastolic dysfunction.   RV mildly enlarged and hypokinetic.   At least mild pulmonary hypertension. (RVSP 55)   These findings are consistent with acute pulmonary embolus causing RV  strain.    Electronically signed by Odin Narvaez MD (Interpreting physician) on 01/28/2018 09:52 AM    CTA 1/28/18 [facility]) Staddle embolus, 15mm SHARON nodule    Objective:   Vitals: /88   Pulse 84   Temp 98.2 °F (36.8 °C) (Temporal)   Resp 20   Ht 6' (1.829 m)   Wt 254 lb (115.2 kg)   SpO2 96%   BMI 34.45 kg/m²   24HR INTAKE/OUTPUT:      Intake/Output Summary (Last 24 hours) at 01/29/18 1025  Last data filed at 01/29/18 0802   Gross per 24 hour   Intake          2791.36 ml   Output             3240 ml   Net          -448.64 ml     General appearance: alert and cooperative with exam  HEENT: atraumatic, eyes with clear conjunctiva and normal lids, pupils and irises normal, external ears and nose are normal, lips normal. Neck without masses, lympadenopathy, bruit, thyroid normal  Lungs: no increased work of breathing, \"clear to auscultation bilaterally\" without rales, rhonchi or wheezes  Heart: regular rate and rhythm, S1, S2 normal, no murmur, click, rub or gallop  Abdomen: soft,
CCU.  Reviewed data, home meds, orders in. He is worried about insulin use, explained it may be temporary and he may be able to transition to PO at DC, but for now is needed. Critical care exceeds 30 minutes so far.     Jigar Pal MD  Rounding Hospitalist

## 2018-02-15 ENCOUNTER — OFFICE VISIT (OUTPATIENT)
Dept: VASCULAR SURGERY | Age: 68
End: 2018-02-15
Payer: MEDICARE

## 2018-02-15 ENCOUNTER — HOSPITAL ENCOUNTER (OUTPATIENT)
Dept: VASCULAR LAB | Age: 68
Discharge: HOME | End: 2018-02-15
Payer: MEDICARE

## 2018-02-15 VITALS
SYSTOLIC BLOOD PRESSURE: 102 MMHG | TEMPERATURE: 96.2 F | DIASTOLIC BLOOD PRESSURE: 76 MMHG | HEART RATE: 95 BPM | RESPIRATION RATE: 18 BRPM

## 2018-02-15 DIAGNOSIS — Z86.711 PERSONAL HISTORY OF PE (PULMONARY EMBOLISM): Primary | ICD-10-CM

## 2018-02-15 DIAGNOSIS — M79.605 PAIN OF LEFT LOWER EXTREMITY: Primary | ICD-10-CM

## 2018-02-15 DIAGNOSIS — I82.412 DEEP VEIN THROMBOSIS (DVT) OF FEMORAL VEIN OF LEFT LOWER EXTREMITY, UNSPECIFIED CHRONICITY (HCC): ICD-10-CM

## 2018-02-15 DIAGNOSIS — M79.605 PAIN OF LEFT LOWER EXTREMITY: ICD-10-CM

## 2018-02-15 DIAGNOSIS — M79.89 LEG SWELLING: ICD-10-CM

## 2018-02-15 LAB
INTERNATIONAL NORMALIZATION RATIO, POC: 1.2
PROTHROMBIN TIME, POC: ABNORMAL

## 2018-02-15 PROCEDURE — 85610 PROTHROMBIN TIME: CPT | Performed by: PHYSICIAN ASSISTANT

## 2018-02-15 PROCEDURE — 99213 OFFICE O/P EST LOW 20 MIN: CPT | Performed by: PHYSICIAN ASSISTANT

## 2018-02-15 PROCEDURE — 93971 EXTREMITY STUDY: CPT

## 2018-02-19 ENCOUNTER — NURSE ONLY (OUTPATIENT)
Dept: VASCULAR SURGERY | Age: 68
End: 2018-02-19
Payer: MEDICARE

## 2018-02-19 DIAGNOSIS — I26.02 ACUTE SADDLE PULMONARY EMBOLISM WITH ACUTE COR PULMONALE (HCC): Primary | ICD-10-CM

## 2018-02-19 LAB
INTERNATIONAL NORMALIZATION RATIO, POC: 1.3
PROTHROMBIN TIME, POC: ABNORMAL

## 2018-02-19 PROCEDURE — 85610 PROTHROMBIN TIME: CPT | Performed by: NURSE PRACTITIONER

## 2018-02-19 RX ORDER — WARFARIN SODIUM 10 MG/1
TABLET ORAL
Qty: 45 TABLET | Refills: 1 | Status: SHIPPED | OUTPATIENT
Start: 2018-02-19 | End: 2018-04-30 | Stop reason: SDUPTHER

## 2018-02-19 NOTE — PATIENT INSTRUCTIONS
The patient is at 1.3 today. I spoke with Torri Bundy and she suggested that we give the patient an increased dose of warfarin to get it in his system, then try to regulate his INR from there. The patient reports that he has been taking 7.5mg daily. The patient is to take 15mg today, tomorrow, and Wednesday. The patient is to take the Lovenox injections every 12 hours for the next 3 days. The patient is to recheck his INR again on Thursday morning. The patient is to bring a Lovenox injection with him to this office visit, [facility]. Please take your warfarin in the evening time. The pt is to go to the nearest ED with any signs of bleeding. The patient voiced understanding.

## 2018-02-22 ENCOUNTER — NURSE ONLY (OUTPATIENT)
Dept: VASCULAR SURGERY | Age: 68
End: 2018-02-22
Payer: MEDICARE

## 2018-02-22 DIAGNOSIS — I26.02 ACUTE SADDLE PULMONARY EMBOLISM WITH ACUTE COR PULMONALE (HCC): Primary | ICD-10-CM

## 2018-02-22 LAB
INTERNATIONAL NORMALIZATION RATIO, POC: 1.8
PROTHROMBIN TIME, POC: ABNORMAL

## 2018-02-22 PROCEDURE — 85610 PROTHROMBIN TIME: CPT | Performed by: NURSE PRACTITIONER

## 2018-02-22 RX ORDER — OMEPRAZOLE 20 MG/1
20 CAPSULE, DELAYED RELEASE ORAL 2 TIMES DAILY
COMMUNITY

## 2018-02-22 NOTE — PROGRESS NOTES
The patient is at 1.8 today. I spoke with [facility] and she would like for the patient to take 10mg today. Continue taking the Lovenox injections. The patient is return tomorrow for a recheck.

## 2018-02-23 ENCOUNTER — NURSE ONLY (OUTPATIENT)
Dept: VASCULAR SURGERY | Age: 68
End: 2018-02-23
Payer: MEDICARE

## 2018-02-23 DIAGNOSIS — I26.02 ACUTE SADDLE PULMONARY EMBOLISM WITH ACUTE COR PULMONALE (HCC): Primary | ICD-10-CM

## 2018-02-23 LAB
INTERNATIONAL NORMALIZATION RATIO, POC: 2.3
PROTHROMBIN TIME, POC: NORMAL

## 2018-02-23 PROCEDURE — 85610 PROTHROMBIN TIME: CPT | Performed by: NURSE PRACTITIONER

## 2018-02-23 NOTE — PROGRESS NOTES
The patient is at 2.3 today. The patient is to discontinue taking the Lovenox injections. The patient is to take 10mg today and 10mg tomorrow. The patient is to take 12.5mg on Sunday. The patient is to take 10mg on Monday and 10mg on Tuesday. The patient is to recheck his INR again on Wednesday. The patient voiced understanding.

## 2018-02-28 ENCOUNTER — NURSE ONLY (OUTPATIENT)
Dept: VASCULAR SURGERY | Age: 68
End: 2018-02-28
Payer: MEDICARE

## 2018-02-28 DIAGNOSIS — I26.02 ACUTE SADDLE PULMONARY EMBOLISM WITH ACUTE COR PULMONALE (HCC): Primary | ICD-10-CM

## 2018-02-28 LAB
INTERNATIONAL NORMALIZATION RATIO, POC: 2.9
PROTHROMBIN TIME, POC: NORMAL

## 2018-02-28 PROCEDURE — 85610 PROTHROMBIN TIME: CPT | Performed by: NURSE PRACTITIONER

## 2018-03-02 PROBLEM — Z86.711 PERSONAL HISTORY OF PE (PULMONARY EMBOLISM): Status: ACTIVE | Noted: 2018-03-02

## 2018-03-15 ENCOUNTER — OFFICE VISIT (OUTPATIENT)
Dept: VASCULAR SURGERY | Age: 68
End: 2018-03-15
Payer: MEDICARE

## 2018-03-15 ENCOUNTER — HOSPITAL ENCOUNTER (OUTPATIENT)
Dept: NON INVASIVE DIAGNOSTICS | Age: 68
Discharge: HOME | End: 2018-03-15
Payer: MEDICARE

## 2018-03-15 VITALS
HEART RATE: 76 BPM | TEMPERATURE: 98.6 F | DIASTOLIC BLOOD PRESSURE: 74 MMHG | SYSTOLIC BLOOD PRESSURE: 119 MMHG | RESPIRATION RATE: 18 BRPM

## 2018-03-15 DIAGNOSIS — I82.4Y2 DEEP VEIN THROMBOSIS (DVT) OF PROXIMAL VEIN OF LEFT LOWER EXTREMITY, UNSPECIFIED CHRONICITY (HCC): ICD-10-CM

## 2018-03-15 DIAGNOSIS — I26.02 ACUTE SADDLE PULMONARY EMBOLISM WITH ACUTE COR PULMONALE (HCC): ICD-10-CM

## 2018-03-15 DIAGNOSIS — Z86.711 PERSONAL HISTORY OF PE (PULMONARY EMBOLISM): Primary | ICD-10-CM

## 2018-03-15 LAB
INTERNATIONAL NORMALIZATION RATIO, POC: 4
PROTHROMBIN TIME, POC: ABNORMAL

## 2018-03-15 PROCEDURE — 85610 PROTHROMBIN TIME: CPT | Performed by: PHYSICIAN ASSISTANT

## 2018-03-15 PROCEDURE — 93970 EXTREMITY STUDY: CPT

## 2018-03-15 PROCEDURE — 99213 OFFICE O/P EST LOW 20 MIN: CPT | Performed by: PHYSICIAN ASSISTANT

## 2018-03-15 RX ORDER — UBIDECARENONE 75 MG
100 CAPSULE ORAL DAILY
COMMUNITY

## 2018-03-15 NOTE — PROGRESS NOTES
1988    L4, L5, S1    BLADDER REMOVAL  2008    Neobladder implanted    COLONOSCOPY      HERNIA REPAIR      Umbilical    URETER STENT PLACEMENT Left 2008    ureter stent placement and removed     Family History   Problem Relation Age of Onset    Other Paternal Cousin      Social History   Substance Use Topics    Smoking status: Former Smoker     Packs/day: 1.00     Years: 20.00     Types: Cigarettes     Start date: 1/1/1987     Quit date: 1/1/2007    Smokeless tobacco: Never Used    Alcohol use Yes      Comment: 5 a week       Review of Systems    Constitutional  no significant activity change, appetite change, or unexpected weight change. No fever or chills. No diaphoresis or significant fatigue. HENT  no significant rhinorrhea or epistaxis. No tinnitus or significant hearing loss. Eyes  no sudden vision change or amaurosis. Respiratory  no significant shortness of breath, wheezing, or stridor. No apnea, cough, or chest tightness associated with shortness of breath. Cardiovascular  no chest pain, syncope, or significant dizziness. No palpitations or significant leg swelling. No claudication. Gastrointestinal  no abdominal swelling or pain. No blood in stool. No severe constipation, diarrhea, nausea, or vomiting. Genitourinary  No difficulty urinating, dysuria, frequency, or urgency. No flank pain or hematuria. Musculoskeletal  no back pain, gait disturbance, or myalgia. Skin  no color change, rash, pallor, or new wound. Neurologic  no dizziness, facial asymmetry, or light headedness. No seizures. No speech difficulty or lateralizing weakness. Hematologic  no easy bruising or excessive bleeding. Psychiatric  no severe anxiety or nervousness. No confusion. All other review of systems are negative. Physical Exam    /74 Comment: right  Pulse 76   Temp 98.6 °F (37 °C)   Resp 18     Constitutional  well developed, well nourished.   No diaphoresis or acute

## 2018-03-29 ENCOUNTER — NURSE ONLY (OUTPATIENT)
Dept: VASCULAR SURGERY | Age: 68
End: 2018-03-29
Payer: MEDICARE

## 2018-03-29 VITALS — HEART RATE: 62 BPM | DIASTOLIC BLOOD PRESSURE: 78 MMHG | SYSTOLIC BLOOD PRESSURE: 128 MMHG

## 2018-03-29 DIAGNOSIS — I26.02 ACUTE SADDLE PULMONARY EMBOLISM WITH ACUTE COR PULMONALE (HCC): Primary | ICD-10-CM

## 2018-03-29 LAB
INTERNATIONAL NORMALIZATION RATIO, POC: 2.7
PROTHROMBIN TIME, POC: NORMAL

## 2018-03-29 PROCEDURE — 85610 PROTHROMBIN TIME: CPT | Performed by: NURSE PRACTITIONER

## 2018-03-29 RX ORDER — LANOLIN ALCOHOL/MO/W.PET/CERES
50 CREAM (GRAM) TOPICAL DAILY
COMMUNITY

## 2018-04-11 PROBLEM — R77.8 ELEVATED TROPONIN: Status: RESOLVED | Noted: 2018-01-28 | Resolved: 2018-04-11

## 2018-04-19 ENCOUNTER — NURSE ONLY (OUTPATIENT)
Dept: VASCULAR SURGERY | Age: 68
End: 2018-04-19
Payer: MEDICARE

## 2018-04-19 VITALS
WEIGHT: 255 LBS | BODY MASS INDEX: 33.8 KG/M2 | HEIGHT: 73 IN | SYSTOLIC BLOOD PRESSURE: 138 MMHG | DIASTOLIC BLOOD PRESSURE: 81 MMHG | HEART RATE: 65 BPM | RESPIRATION RATE: 18 BRPM

## 2018-04-19 DIAGNOSIS — Z86.711 PERSONAL HISTORY OF PE (PULMONARY EMBOLISM): Primary | ICD-10-CM

## 2018-04-19 LAB
INTERNATIONAL NORMALIZATION RATIO, POC: 3.4
PROTHROMBIN TIME, POC: NORMAL

## 2018-04-19 PROCEDURE — 85610 PROTHROMBIN TIME: CPT | Performed by: NURSE PRACTITIONER

## 2018-05-03 ENCOUNTER — NURSE ONLY (OUTPATIENT)
Dept: VASCULAR SURGERY | Age: 68
End: 2018-05-03
Payer: MEDICARE

## 2018-05-03 DIAGNOSIS — I26.02 ACUTE SADDLE PULMONARY EMBOLISM WITH ACUTE COR PULMONALE (HCC): Primary | ICD-10-CM

## 2018-05-03 LAB
INTERNATIONAL NORMALIZATION RATIO, POC: 2.2
PROTHROMBIN TIME, POC: NORMAL

## 2018-05-03 PROCEDURE — 85610 PROTHROMBIN TIME: CPT | Performed by: NURSE PRACTITIONER

## 2018-05-29 ENCOUNTER — NURSE ONLY (OUTPATIENT)
Dept: VASCULAR SURGERY | Age: 68
End: 2018-05-29
Payer: MEDICARE

## 2018-05-29 DIAGNOSIS — I26.02 ACUTE SADDLE PULMONARY EMBOLISM WITH ACUTE COR PULMONALE (HCC): Primary | ICD-10-CM

## 2018-05-29 LAB
INTERNATIONAL NORMALIZATION RATIO, POC: 4
PROTHROMBIN TIME, POC: ABNORMAL

## 2018-05-29 PROCEDURE — 85610 PROTHROMBIN TIME: CPT | Performed by: NURSE PRACTITIONER

## 2018-06-05 ENCOUNTER — NURSE ONLY (OUTPATIENT)
Dept: VASCULAR SURGERY | Age: 68
End: 2018-06-05
Payer: MEDICARE

## 2018-06-05 DIAGNOSIS — I26.02 ACUTE SADDLE PULMONARY EMBOLISM WITH ACUTE COR PULMONALE (HCC): Primary | ICD-10-CM

## 2018-06-05 LAB
INTERNATIONAL NORMALIZATION RATIO, POC: 2.2
PROTHROMBIN TIME, POC: NORMAL

## 2018-06-05 PROCEDURE — 85610 PROTHROMBIN TIME: CPT | Performed by: NURSE PRACTITIONER

## 2018-06-26 ENCOUNTER — NURSE ONLY (OUTPATIENT)
Dept: VASCULAR SURGERY | Age: 68
End: 2018-06-26
Payer: MEDICARE

## 2018-06-26 DIAGNOSIS — I26.92 ACUTE SADDLE PULMONARY EMBOLISM WITHOUT ACUTE COR PULMONALE (HCC): Primary | ICD-10-CM

## 2018-06-26 LAB
INTERNATIONAL NORMALIZATION RATIO, POC: 3.2
PROTHROMBIN TIME, POC: ABNORMAL

## 2018-06-26 PROCEDURE — 85610 PROTHROMBIN TIME: CPT | Performed by: NURSE PRACTITIONER

## 2018-07-03 ENCOUNTER — NURSE ONLY (OUTPATIENT)
Dept: VASCULAR SURGERY | Age: 68
End: 2018-07-03
Payer: MEDICARE

## 2018-07-03 DIAGNOSIS — I26.02 ACUTE SADDLE PULMONARY EMBOLISM WITH ACUTE COR PULMONALE (HCC): Primary | ICD-10-CM

## 2018-07-03 LAB
INTERNATIONAL NORMALIZATION RATIO, POC: 3.9
PROTHROMBIN TIME, POC: ABNORMAL

## 2018-07-03 PROCEDURE — 85610 PROTHROMBIN TIME: CPT | Performed by: NURSE PRACTITIONER

## 2018-07-17 ENCOUNTER — NURSE ONLY (OUTPATIENT)
Dept: VASCULAR SURGERY | Age: 68
End: 2018-07-17
Payer: MEDICARE

## 2018-07-17 DIAGNOSIS — I26.02 ACUTE SADDLE PULMONARY EMBOLISM WITH ACUTE COR PULMONALE (HCC): Primary | ICD-10-CM

## 2018-07-17 LAB
INTERNATIONAL NORMALIZATION RATIO, POC: 1.9
PROTHROMBIN TIME, POC: ABNORMAL

## 2018-07-17 PROCEDURE — 85610 PROTHROMBIN TIME: CPT | Performed by: NURSE PRACTITIONER

## 2018-07-17 NOTE — PROGRESS NOTES
The pt is at 1.9 today. He states he did have vitamin k intake. He will continue to take 5 mg on Tue, and Fri, 10 mg the rest of the week. Recheck in 2 weeks. Pt is aware.

## 2018-07-31 ENCOUNTER — NURSE ONLY (OUTPATIENT)
Dept: VASCULAR SURGERY | Age: 68
End: 2018-07-31
Payer: MEDICARE

## 2018-07-31 DIAGNOSIS — I26.92 ACUTE SADDLE PULMONARY EMBOLISM WITHOUT ACUTE COR PULMONALE (HCC): Primary | ICD-10-CM

## 2018-07-31 LAB
INTERNATIONAL NORMALIZATION RATIO, POC: 2.1
PROTHROMBIN TIME, POC: NORMAL

## 2018-07-31 PROCEDURE — 85610 PROTHROMBIN TIME: CPT | Performed by: NURSE PRACTITIONER

## 2018-08-21 ENCOUNTER — NURSE ONLY (OUTPATIENT)
Dept: VASCULAR SURGERY | Age: 68
End: 2018-08-21

## 2018-08-21 DIAGNOSIS — I26.02 ACUTE SADDLE PULMONARY EMBOLISM WITH ACUTE COR PULMONALE (HCC): Primary | ICD-10-CM

## 2018-08-21 LAB
INTERNATIONAL NORMALIZATION RATIO, POC: 3.5
PROTHROMBIN TIME, POC: ABNORMAL

## 2018-08-21 PROCEDURE — 85610 PROTHROMBIN TIME: CPT | Performed by: PHYSICIAN ASSISTANT

## 2018-09-04 ENCOUNTER — NURSE ONLY (OUTPATIENT)
Dept: VASCULAR SURGERY | Age: 68
End: 2018-09-04

## 2018-09-04 DIAGNOSIS — I26.02 ACUTE SADDLE PULMONARY EMBOLISM WITH ACUTE COR PULMONALE (HCC): Primary | ICD-10-CM

## 2018-09-04 LAB
INTERNATIONAL NORMALIZATION RATIO, POC: 3.6
PROTHROMBIN TIME, POC: ABNORMAL

## 2018-09-04 PROCEDURE — 85610 PROTHROMBIN TIME: CPT | Performed by: NURSE PRACTITIONER

## 2018-09-04 NOTE — PATIENT INSTRUCTIONS
Pt is 3.6 today, Pt instructed to HOLD todays dose and take Coumadin 5mg on Wednesday this week recheck in 2 weeks.  OB

## 2018-09-18 ENCOUNTER — NURSE ONLY (OUTPATIENT)
Dept: VASCULAR SURGERY | Age: 68
End: 2018-09-18
Payer: MEDICARE

## 2018-09-18 DIAGNOSIS — I26.02 ACUTE SADDLE PULMONARY EMBOLISM WITH ACUTE COR PULMONALE (HCC): Primary | ICD-10-CM

## 2018-09-18 LAB
INTERNATIONAL NORMALIZATION RATIO, POC: 1.5
PROTHROMBIN TIME, POC: ABNORMAL

## 2018-09-18 PROCEDURE — 85610 PROTHROMBIN TIME: CPT | Performed by: NURSE PRACTITIONER

## 2018-09-18 NOTE — PROGRESS NOTES
The pt is at 1.5 today. He will take 10 mg tonight, then resume regular dose, resume schedule. Recheck on Fri. Pt is aware.

## 2018-09-21 ENCOUNTER — NURSE ONLY (OUTPATIENT)
Dept: VASCULAR SURGERY | Age: 68
End: 2018-09-21
Payer: MEDICARE

## 2018-09-21 DIAGNOSIS — I26.02 ACUTE SADDLE PULMONARY EMBOLISM WITH ACUTE COR PULMONALE (HCC): Primary | ICD-10-CM

## 2018-09-21 LAB
INTERNATIONAL NORMALIZATION RATIO, POC: 1.4
PROTHROMBIN TIME, POC: ABNORMAL

## 2018-09-21 PROCEDURE — 85610 PROTHROMBIN TIME: CPT | Performed by: NURSE PRACTITIONER

## 2018-09-21 NOTE — PROGRESS NOTES
The pt was at 1.4 today. Options were discussed with the pt in regards to other anticoagulant therapy due to the pt not being able to become therapeutic while taking coumadin. The pt asked to be changed to Xarelto. He will d/c the coumadin, take 20 mg of Xarelto daily. He has an upcoming f/u in Feb. At that time we will check the progress of his DVT and PE. Pt is aware.

## 2018-11-12 ENCOUNTER — HOSPITAL ENCOUNTER (OUTPATIENT)
Age: 68
Setting detail: SPECIMEN
Discharge: HOME | End: 2018-11-12
Payer: COMMERCIAL

## 2018-11-12 LAB
INR BLD: 1.8
PROTHROMBIN TIME: 18.1 SEC (ref 9–12)

## 2018-11-26 ENCOUNTER — HOSPITAL ENCOUNTER (OUTPATIENT)
Age: 68
Setting detail: SPECIMEN
Discharge: HOME | End: 2018-11-26
Payer: COMMERCIAL

## 2018-11-26 LAB
INR BLD: 2.1
PROTHROMBIN TIME: 21.3 SEC (ref 9–12)

## 2018-12-31 ENCOUNTER — HOSPITAL ENCOUNTER (OUTPATIENT)
Age: 68
Setting detail: SPECIMEN
Discharge: HOME | End: 2018-12-31
Payer: COMMERCIAL

## 2019-01-07 ENCOUNTER — HOSPITAL ENCOUNTER (OUTPATIENT)
Age: 69
Setting detail: SPECIMEN
Discharge: HOME | End: 2019-01-07
Payer: MEDICARE

## 2019-01-08 LAB
INR BLD: NORMAL
PROTHROMBIN TIME: NORMAL SEC (ref 9–12)

## 2019-03-21 ENCOUNTER — OFFICE VISIT (OUTPATIENT)
Dept: VASCULAR SURGERY | Age: 69
End: 2019-03-21
Payer: MEDICARE

## 2019-03-21 ENCOUNTER — HOSPITAL ENCOUNTER (OUTPATIENT)
Dept: VASCULAR LAB | Age: 69
Discharge: HOME | End: 2019-03-21
Payer: MEDICARE

## 2019-03-21 VITALS
TEMPERATURE: 96 F | HEART RATE: 77 BPM | SYSTOLIC BLOOD PRESSURE: 145 MMHG | DIASTOLIC BLOOD PRESSURE: 85 MMHG | RESPIRATION RATE: 18 BRPM | OXYGEN SATURATION: 98 %

## 2019-03-21 DIAGNOSIS — Z86.711 PERSONAL HISTORY OF PE (PULMONARY EMBOLISM): ICD-10-CM

## 2019-03-21 DIAGNOSIS — Z86.711 PERSONAL HISTORY OF PE (PULMONARY EMBOLISM): Primary | ICD-10-CM

## 2019-03-21 DIAGNOSIS — I82.512 CHRONIC DEEP VEIN THROMBOSIS (DVT) OF FEMORAL VEIN OF LEFT LOWER EXTREMITY (HCC): ICD-10-CM

## 2019-03-21 DIAGNOSIS — I26.02 ACUTE SADDLE PULMONARY EMBOLISM WITH ACUTE COR PULMONALE (HCC): Primary | ICD-10-CM

## 2019-03-21 PROCEDURE — 93970 EXTREMITY STUDY: CPT

## 2019-03-21 PROCEDURE — 99213 OFFICE O/P EST LOW 20 MIN: CPT | Performed by: PHYSICIAN ASSISTANT

## 2019-03-25 ENCOUNTER — TELEPHONE (OUTPATIENT)
Dept: PODIATRY | Age: 69
End: 2019-03-25

## 2019-03-27 ENCOUNTER — TELEPHONE (OUTPATIENT)
Dept: VASCULAR SURGERY | Age: 69
End: 2019-03-27

## 2020-01-27 ENCOUNTER — HOSPITAL ENCOUNTER (OUTPATIENT)
Dept: PULMONOLOGY | Age: 70
Discharge: HOME | End: 2020-01-27
Payer: MEDICARE

## 2020-01-27 ENCOUNTER — HOSPITAL ENCOUNTER (OUTPATIENT)
Dept: NUCLEAR MEDICINE | Age: 70
Discharge: HOME | End: 2020-01-29
Payer: MEDICARE

## 2020-01-27 ENCOUNTER — OUTSIDE FACILITY SERVICE (OUTPATIENT)
Dept: PULMONOLOGY | Facility: CLINIC | Age: 70
End: 2020-01-27

## 2020-01-27 LAB
GLUCOSE BLD-MCNC: 127 MG/DL (ref 70–99)
PERFORMED ON: ABNORMAL

## 2020-01-27 PROCEDURE — 94729 DIFFUSING CAPACITY: CPT | Performed by: INTERNAL MEDICINE

## 2020-01-27 PROCEDURE — 6360000002 HC RX W HCPCS: Performed by: INTERNAL MEDICINE

## 2020-01-27 PROCEDURE — 94729 DIFFUSING CAPACITY: CPT

## 2020-01-27 PROCEDURE — 94727 GAS DIL/WSHOT DETER LNG VOL: CPT

## 2020-01-27 PROCEDURE — 3430000000 HC RX DIAGNOSTIC RADIOPHARMACEUTICAL: Performed by: INTERNAL MEDICINE

## 2020-01-27 PROCEDURE — A9552 F18 FDG: HCPCS | Performed by: INTERNAL MEDICINE

## 2020-01-27 PROCEDURE — 94060 EVALUATION OF WHEEZING: CPT

## 2020-01-27 PROCEDURE — 78815 PET IMAGE W/CT SKULL-THIGH: CPT

## 2020-01-27 PROCEDURE — 94060 EVALUATION OF WHEEZING: CPT | Performed by: INTERNAL MEDICINE

## 2020-01-27 PROCEDURE — 94727 GAS DIL/WSHOT DETER LNG VOL: CPT | Performed by: INTERNAL MEDICINE

## 2020-01-27 PROCEDURE — 82948 REAGENT STRIP/BLOOD GLUCOSE: CPT

## 2020-01-27 RX ORDER — ALBUTEROL SULFATE 2.5 MG/3ML
2.5 SOLUTION RESPIRATORY (INHALATION) EVERY 6 HOURS PRN
Status: DISCONTINUED | OUTPATIENT
Start: 2020-01-27 | End: 2020-01-29 | Stop reason: HOSPADM

## 2020-01-27 RX ORDER — FLUDEOXYGLUCOSE F 18 200 MCI/ML
10 INJECTION, SOLUTION INTRAVENOUS
Status: COMPLETED | OUTPATIENT
Start: 2020-01-27 | End: 2020-01-27

## 2020-01-27 RX ADMIN — FLUDEOXYGLUCOSE F 18 10 MILLICURIE: 200 INJECTION, SOLUTION INTRAVENOUS at 11:07

## 2020-01-27 RX ADMIN — ALBUTEROL SULFATE 2.5 MG: 2.5 SOLUTION RESPIRATORY (INHALATION) at 08:32

## 2020-07-17 ENCOUNTER — TELEPHONE (OUTPATIENT)
Dept: INFUSION THERAPY | Age: 70
End: 2020-07-17

## 2020-07-17 NOTE — TELEPHONE ENCOUNTER
Spoke with patient who states he is inpatient at [facility] anticipating discharge tomorrow. He states that with previous cycle his Steve Idol was sent to his home for self injection. He understands to clarify prior to discharge. (if intended to give in clinic will need to be ordered/approved)  He has appt scheduled in clinic on Monday for labs. Phoned [phone] ([facility]-Dr Dayo Goetz) for clarification. Left message, anticipating call back on when/where patient is to receive Udenyca.

## 2020-07-17 NOTE — TELEPHONE ENCOUNTER
Agueda Quinones RN from Dr Mary Tubbs office returned call regarding Rajwinder Greyson for patient. She anticipates patinet discharge Saturday or Sunday and patient to get Rajwinder Schuyler in clinic with labs on Monday (or Tuesday if needed) in clinic. I explained that preauthorization would be necessary. Will begin process now and let her know if there is any problem  The alternative would be to send via [facility] as before, but she states that they had difficulty getting it delivered in a timely matter and patient preferred to get in [facility] if possible. Agueda Quinones can be reached at [phone].

## 2023-05-25 ENCOUNTER — ANTI-COAG VISIT (OUTPATIENT)
Dept: CARDIOLOGY | Age: 73
End: 2023-05-25

## 2023-05-25 ENCOUNTER — TELEPHONE (OUTPATIENT)
Dept: CARDIOLOGY | Age: 73
End: 2023-05-25